# Patient Record
Sex: FEMALE
[De-identification: names, ages, dates, MRNs, and addresses within clinical notes are randomized per-mention and may not be internally consistent; named-entity substitution may affect disease eponyms.]

---

## 2022-09-02 PROBLEM — Z00.00 ENCOUNTER FOR PREVENTIVE HEALTH EXAMINATION: Status: ACTIVE | Noted: 2022-09-02

## 2022-09-07 ENCOUNTER — NON-APPOINTMENT (OUTPATIENT)
Age: 67
End: 2022-09-07

## 2022-09-07 ENCOUNTER — APPOINTMENT (OUTPATIENT)
Dept: CARDIOLOGY | Facility: CLINIC | Age: 67
End: 2022-09-07

## 2022-09-07 VITALS
HEIGHT: 64 IN | OXYGEN SATURATION: 99 % | WEIGHT: 190 LBS | DIASTOLIC BLOOD PRESSURE: 84 MMHG | TEMPERATURE: 97.3 F | SYSTOLIC BLOOD PRESSURE: 151 MMHG | HEART RATE: 80 BPM | RESPIRATION RATE: 12 BRPM | BODY MASS INDEX: 32.44 KG/M2

## 2022-09-07 VITALS — SYSTOLIC BLOOD PRESSURE: 138 MMHG | DIASTOLIC BLOOD PRESSURE: 78 MMHG

## 2022-09-07 DIAGNOSIS — I10 ESSENTIAL (PRIMARY) HYPERTENSION: ICD-10-CM

## 2022-09-07 DIAGNOSIS — Z87.891 PERSONAL HISTORY OF NICOTINE DEPENDENCE: ICD-10-CM

## 2022-09-07 DIAGNOSIS — R00.2 PALPITATIONS: ICD-10-CM

## 2022-09-07 PROCEDURE — 99204 OFFICE O/P NEW MOD 45 MIN: CPT | Mod: 25

## 2022-09-07 PROCEDURE — 93000 ELECTROCARDIOGRAM COMPLETE: CPT | Mod: 59

## 2022-09-07 RX ORDER — CHROMIUM 200 MCG
TABLET ORAL
Refills: 0 | Status: ACTIVE | COMMUNITY

## 2022-09-07 NOTE — CARDIOLOGY SUMMARY
[de-identified] : 9/7/2022: Sinus Rhythm at 74 beats per minute and an incomplete right bundle branch block

## 2022-09-07 NOTE — PHYSICAL EXAM
[Well Developed] : well developed [Well Nourished] : well nourished [No Acute Distress] : no acute distress [Normal Conjunctiva] : normal conjunctiva [Normal Venous Pressure] : normal venous pressure [No Carotid Bruit] : no carotid bruit [Normal Rate] : normal [Rhythm Regular] : regular [Normal S1] : normal S1 [Normal S2] : normal S2 [No Murmur] : no murmurs heard [No Pitting Edema] : no pitting edema present [Clear Lung Fields] : clear lung fields [Good Air Entry] : good air entry [No Respiratory Distress] : no respiratory distress  [Soft] : abdomen soft [Non Tender] : non-tender [Normal Bowel Sounds] : normal bowel sounds [Normal Gait] : normal gait [Gait - Sufficient for Exercise Testing] : gait - sufficient for exercise testing [No Edema] : no edema [No Cyanosis] : no cyanosis [No Rash] : no rash [Moves all extremities] : moves all extremities [No Focal Deficits] : no focal deficits [Normal Speech] : normal speech [Alert and Oriented] : alert and oriented [Normal memory] : normal memory [S3] : no S3 [Right Carotid Bruit] : no bruit heard over the right carotid [Left Carotid Bruit] : no bruit heard over the left carotid [Bruit] : no bruit heard [de-identified] : Extraocular muscles intact. Anicteric sclerae. [de-identified] : She was wearing a face mask during the examination. [de-identified] : No visible skin ulcers.

## 2022-09-07 NOTE — DISCUSSION/SUMMARY
[EKG obtained to assist in diagnosis and management of assessed problem(s)] : EKG obtained to assist in diagnosis and management of assessed problem(s) [FreeTextEntry1] : IMPRESSION: Ms. TAY is a 66-year-old woman with a history of hypertension, former tobacco use, family history of coronary artery disease, and obesity status post gastric sleeve surgery in May 2019 who presents today for evaluation of palpitations. \par \par PLAN:\par 1.  It is possible that her palpitations and episodes of weakness are related to dehydration.  We discussed the importance of adequate hydration.  There was no ectopy on exam or on her ECG.  She will have a 1 week Zio patch placed today to evaluate for any arrhythmias that may be contributing to her symptoms.  She will also have the blood work that you requested her to have last month to rule out any metabolic abnormalities that may be contributing to her weakness.  She will also schedule an echocardiogram given her palpitations.\par 2.  Her blood pressure was initially elevated, however, improved when it was repeated at the time of the physical examination.  For now, she will continue on diet modification and she understands the importance of weight loss.\par 3.  She was in sinus rhythm with an incomplete right bundle branch block on her electrocardiogram that was performed today.  She mentions rare episodes of dyspnea.  Should her echocardiogram and Zio patch to be unremarkable, then we will proceed with an exercise stress test.\par 4.  She will follow-up with me after her cardiac tests have been performed.

## 2022-09-07 NOTE — HISTORY OF PRESENT ILLNESS
[FreeTextEntry1] : Patient is a 66-year-old woman with a history of hypertension, former tobacco use, family history of coronary artery disease, and obesity status post gastric sleeve surgery in May 2019 who presents today for evaluation of palpitations.  She states that she has been off of antihypertensives since getting her weight down to 180 pounds.  Recently she has noticed that her weight has started going up and she feels that her blood pressure has also trended up in that time period.  She states that last month she had an episode where she became diaphoretic after eating a cookie and drank baking soda and symptoms resolved.  She has had previous episodes of diaphoresis with associated weakness and "shakiness" when walking with associated dyspnea.  She measured her blood pressure on occasion during these episodes and she has noticed blood pressure readings of 90s/50s.  She does state that it is possible that these episodes are related to dehydration as she drinks about 16 to 20 ounces of water every day.  She also states that with these episodes of diaphoresis she experiences palpitations.  She is wondering if some of the symptoms may be related to not sleeping well at night.  She otherwise denies any chest pain.

## 2022-09-23 ENCOUNTER — APPOINTMENT (OUTPATIENT)
Dept: CARDIOLOGY | Facility: CLINIC | Age: 67
End: 2022-09-23

## 2022-09-23 PROCEDURE — 93306 TTE W/DOPPLER COMPLETE: CPT
